# Patient Record
Sex: FEMALE | Race: BLACK OR AFRICAN AMERICAN | NOT HISPANIC OR LATINO | URBAN - METROPOLITAN AREA
[De-identification: names, ages, dates, MRNs, and addresses within clinical notes are randomized per-mention and may not be internally consistent; named-entity substitution may affect disease eponyms.]

---

## 2021-08-16 ENCOUNTER — OUT OF OFFICE VISIT (OUTPATIENT)
Dept: URBAN - METROPOLITAN AREA MEDICAL CENTER 1 | Facility: MEDICAL CENTER | Age: 81
End: 2021-08-16
Payer: MEDICARE

## 2021-08-16 DIAGNOSIS — D64.89 ANEMIA DUE TO OTHER CAUSE: ICD-10-CM

## 2021-08-16 DIAGNOSIS — K92.1 BLACK STOOL: ICD-10-CM

## 2021-08-16 DIAGNOSIS — K29.60 ADENOPAPILLOMATOSIS GASTRICA: ICD-10-CM

## 2021-08-16 PROCEDURE — 99222 1ST HOSP IP/OBS MODERATE 55: CPT | Performed by: INTERNAL MEDICINE

## 2021-08-16 PROCEDURE — G8427 DOCREV CUR MEDS BY ELIG CLIN: HCPCS | Performed by: INTERNAL MEDICINE

## 2021-08-16 PROCEDURE — 43239 EGD BIOPSY SINGLE/MULTIPLE: CPT | Performed by: INTERNAL MEDICINE

## 2021-08-17 ENCOUNTER — OUT OF OFFICE VISIT (OUTPATIENT)
Dept: URBAN - METROPOLITAN AREA MEDICAL CENTER 1 | Facility: MEDICAL CENTER | Age: 81
End: 2021-08-17
Payer: MEDICARE

## 2021-08-17 DIAGNOSIS — K92.1 BLACK STOOL: ICD-10-CM

## 2021-08-17 PROCEDURE — 45378 DIAGNOSTIC COLONOSCOPY: CPT | Performed by: INTERNAL MEDICINE

## 2021-10-11 ENCOUNTER — OFFICE VISIT (OUTPATIENT)
Dept: URBAN - METROPOLITAN AREA TELEHEALTH 2 | Facility: TELEHEALTH | Age: 81
End: 2021-10-11

## 2021-11-01 ENCOUNTER — OFFICE VISIT (OUTPATIENT)
Dept: URBAN - NONMETROPOLITAN AREA CLINIC 2 | Facility: CLINIC | Age: 81
End: 2021-11-01
Payer: MEDICARE

## 2021-11-01 ENCOUNTER — WEB ENCOUNTER (OUTPATIENT)
Dept: URBAN - NONMETROPOLITAN AREA CLINIC 2 | Facility: CLINIC | Age: 81
End: 2021-11-01

## 2021-11-01 VITALS
HEIGHT: 63 IN | WEIGHT: 183.4 LBS | HEART RATE: 62 BPM | DIASTOLIC BLOOD PRESSURE: 70 MMHG | TEMPERATURE: 98 F | SYSTOLIC BLOOD PRESSURE: 170 MMHG | BODY MASS INDEX: 32.5 KG/M2

## 2021-11-01 DIAGNOSIS — K57.90 DIVERTICULOSIS: ICD-10-CM

## 2021-11-01 DIAGNOSIS — Z72.0 CURRENT TOBACCO USE: ICD-10-CM

## 2021-11-01 DIAGNOSIS — D64.9 ANEMIA, UNSPECIFIED TYPE: ICD-10-CM

## 2021-11-01 DIAGNOSIS — N28.89 RENAL MASS: ICD-10-CM

## 2021-11-01 DIAGNOSIS — K29.70 GASTRITIS, PRESENCE OF BLEEDING UNSPECIFIED, UNSPECIFIED CHRONICITY, UNSPECIFIED GASTRITIS TYPE: ICD-10-CM

## 2021-11-01 DIAGNOSIS — K59.00 CONSTIPATION, UNSPECIFIED CONSTIPATION TYPE: ICD-10-CM

## 2021-11-01 PROCEDURE — 99214 OFFICE O/P EST MOD 30 MIN: CPT | Performed by: INTERNAL MEDICINE

## 2021-11-01 RX ORDER — GLIPIZIDE 5 MG/1
TABLET ORAL
Qty: 180 | Status: ACTIVE | COMMUNITY

## 2021-11-01 RX ORDER — AMLODIPINE BESYLATE 10 MG/1
TABLET ORAL
Qty: 90 | Status: ACTIVE | COMMUNITY

## 2021-11-01 RX ORDER — POLYETHYLENE GLYCOL 3350, SODIUM SULFATE, SODIUM CHLORIDE, POTASSIUM CHLORIDE, ASCORBIC ACID, SODIUM ASCORBATE 140-9-5.2G
AS DIRECTED KIT ORAL AS DIRECTED
Qty: 280 GRAM | Refills: 0 | OUTPATIENT
Start: 2021-11-01 | End: 2021-11-02

## 2021-11-01 RX ORDER — LORATADINE 10 MG
1 PACKET MIXED WITH 8 OUNCES OF FLUID TABLET,DISINTEGRATING ORAL ONCE A DAY
Qty: 90 | Refills: 0 | OUTPATIENT
Start: 2021-11-01 | End: 2022-01-29

## 2021-11-01 RX ORDER — DULOXETINE HYDROCHLORIDE 20 MG/1
CAPSULE, DELAYED RELEASE PELLETS ORAL
Qty: 90 | Status: ACTIVE | COMMUNITY

## 2021-11-01 RX ORDER — PRAVASTATIN SODIUM 80 MG/1
TABLET ORAL
Qty: 90 | Status: ACTIVE | COMMUNITY

## 2021-11-01 RX ORDER — BLOOD SUGAR DIAGNOSTIC
STRIP MISCELLANEOUS
Qty: 100 | Status: ACTIVE | COMMUNITY

## 2021-11-01 RX ORDER — ATENOLOL 50 MG/1
TABLET ORAL
Qty: 90 | Status: ACTIVE | COMMUNITY

## 2021-11-01 RX ORDER — TRIAMTERENE AND HYDROCHLOROTHIAZIDE 37.5; 25 MG/1; MG/1
TABLET ORAL
Qty: 90 | Status: ON HOLD | COMMUNITY

## 2021-11-01 RX ORDER — LANCETS
EACH MISCELLANEOUS
Qty: 100 | Status: ACTIVE | COMMUNITY

## 2021-11-01 RX ORDER — OMEPRAZOLE 40 MG/1
CAPSULE, DELAYED RELEASE ORAL
Qty: 90 | Status: ACTIVE | COMMUNITY

## 2021-11-01 NOTE — HPI-TODAY'S VISIT:
11/1/2021: Gastroenterology Clinic Visit 81 year old female with past medical history of diabetes melltius, hypertension, diverticulosis c/b diverticulitis, tobacco use, who was hospitalized at [facility] from 8/15/2021 to 8/17/2021 for melena who presents for follow-up.  Ms. Garcia was hospitalized at [facility] for melena in August 2021. At the time she did acknowledge taking 2 Aleve per day. During the hospitalization, she underwent an EGD on August 16, 2021 which showed a normal esophagus, normal stomach, normal duodneum. BIopsies were taken of the stomach which showed "mild chronic gastritis without evidence of Helicobacter pylori." A colonoscopy was performed on 8/17/2021 which showed multiple small and large mouthed diverticula in the entire colon to the hepatic flexure. The colonoscope was advanced to the hepatic flexure without difficulty. Due to the development of hypoxia in the setting of oropharyngeal secretions with oxygen saturation decreasing to 55%, the procedure was aborted and the colonoscope was withdrawn without ability to carefully examine the colonic mucosa. There was on evidence of blood loss throughout the colon.  Recommendation was to undergo repeat colonoscopy during the hospitalization although she declined. At today's visit, she notes that she had a colonoscopy 10 years ago but most recently undergwent cologuard within the past 3-4 years. Since being discharged, she has been followed by Urology for a suspeted renal cancer. She is in the process of discussing treatmetn options ofr the renal cancer.  She denies melena, hematochezia, nausea, vomiting, abdominal pain. She is not using ibuprofen or other NSAIDs. She does continue to use tobacco.   She denies family history of colon polyps or colon cancer.   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

## 2021-11-14 ENCOUNTER — DASHBOARD ENCOUNTERS (OUTPATIENT)
Age: 81
End: 2021-11-14

## 2021-11-14 PROBLEM — 110483000: Status: ACTIVE | Noted: 2021-11-14

## 2021-11-14 PROBLEM — 271737000: Status: ACTIVE | Noted: 2021-11-01

## 2021-12-03 ENCOUNTER — TELEPHONE ENCOUNTER (OUTPATIENT)
Dept: URBAN - NONMETROPOLITAN AREA CLINIC 2 | Facility: CLINIC | Age: 81
End: 2021-12-03

## 2021-12-07 ENCOUNTER — OFFICE VISIT (OUTPATIENT)
Dept: URBAN - METROPOLITAN AREA MEDICAL CENTER 1 | Facility: MEDICAL CENTER | Age: 81
End: 2021-12-07
Payer: MEDICARE

## 2021-12-07 DIAGNOSIS — K92.1 ACUTE MELENA: ICD-10-CM

## 2021-12-07 DIAGNOSIS — K63.89 BACTERIAL OVERGROWTH SYNDROME: ICD-10-CM

## 2021-12-07 PROCEDURE — 45380 COLONOSCOPY AND BIOPSY: CPT | Performed by: INTERNAL MEDICINE

## 2021-12-07 RX ORDER — ATENOLOL 50 MG/1
TABLET ORAL
Qty: 90 | Status: ACTIVE | COMMUNITY

## 2021-12-07 RX ORDER — DULOXETINE HYDROCHLORIDE 20 MG/1
CAPSULE, DELAYED RELEASE PELLETS ORAL
Qty: 90 | Status: ACTIVE | COMMUNITY

## 2021-12-07 RX ORDER — OMEPRAZOLE 40 MG/1
CAPSULE, DELAYED RELEASE ORAL
Qty: 90 | Status: ACTIVE | COMMUNITY

## 2021-12-07 RX ORDER — TRIAMTERENE AND HYDROCHLOROTHIAZIDE 37.5; 25 MG/1; MG/1
TABLET ORAL
Qty: 90 | Status: ON HOLD | COMMUNITY

## 2021-12-07 RX ORDER — AMLODIPINE BESYLATE 10 MG/1
TABLET ORAL
Qty: 90 | Status: ACTIVE | COMMUNITY

## 2021-12-07 RX ORDER — LANCETS
EACH MISCELLANEOUS
Qty: 100 | Status: ACTIVE | COMMUNITY

## 2021-12-07 RX ORDER — BLOOD SUGAR DIAGNOSTIC
STRIP MISCELLANEOUS
Qty: 100 | Status: ACTIVE | COMMUNITY

## 2021-12-07 RX ORDER — PRAVASTATIN SODIUM 80 MG/1
TABLET ORAL
Qty: 90 | Status: ACTIVE | COMMUNITY

## 2021-12-07 RX ORDER — GLIPIZIDE 5 MG/1
TABLET ORAL
Qty: 180 | Status: ACTIVE | COMMUNITY

## 2021-12-07 RX ORDER — LORATADINE 10 MG
1 PACKET MIXED WITH 8 OUNCES OF FLUID TABLET,DISINTEGRATING ORAL ONCE A DAY
Qty: 90 | Refills: 0 | Status: ACTIVE | COMMUNITY
Start: 2021-11-01 | End: 2022-01-29

## 2022-01-01 NOTE — PHYSICAL EXAM CONSTITUTIONAL:
well nourished , in no acute distress , ambulating without difficulty , normal communication ability 
Unknown

## 2022-01-07 ENCOUNTER — OFFICE VISIT (OUTPATIENT)
Dept: URBAN - NONMETROPOLITAN AREA CLINIC 2 | Facility: CLINIC | Age: 82
End: 2022-01-07

## 2022-01-14 ENCOUNTER — OFFICE VISIT (OUTPATIENT)
Dept: URBAN - NONMETROPOLITAN AREA CLINIC 2 | Facility: CLINIC | Age: 82
End: 2022-01-14

## 2022-01-14 RX ORDER — TRIAMTERENE AND HYDROCHLOROTHIAZIDE 37.5; 25 MG/1; MG/1
TABLET ORAL
Qty: 90 | Status: ON HOLD | COMMUNITY

## 2022-01-14 RX ORDER — BLOOD SUGAR DIAGNOSTIC
STRIP MISCELLANEOUS
Qty: 100 | Status: ACTIVE | COMMUNITY

## 2022-01-14 RX ORDER — ATENOLOL 50 MG/1
TABLET ORAL
Qty: 90 | Status: ACTIVE | COMMUNITY

## 2022-01-14 RX ORDER — DULOXETINE HYDROCHLORIDE 20 MG/1
CAPSULE, DELAYED RELEASE PELLETS ORAL
Qty: 90 | Status: ACTIVE | COMMUNITY

## 2022-01-14 RX ORDER — LORATADINE 10 MG
1 PACKET MIXED WITH 8 OUNCES OF FLUID TABLET,DISINTEGRATING ORAL ONCE A DAY
Qty: 90 | Refills: 0 | Status: ACTIVE | COMMUNITY
Start: 2021-11-01 | End: 2022-01-29

## 2022-01-14 RX ORDER — LANCETS
EACH MISCELLANEOUS
Qty: 100 | Status: ACTIVE | COMMUNITY

## 2022-01-14 RX ORDER — OMEPRAZOLE 40 MG/1
CAPSULE, DELAYED RELEASE ORAL
Qty: 90 | Status: ACTIVE | COMMUNITY

## 2022-01-14 RX ORDER — AMLODIPINE BESYLATE 10 MG/1
TABLET ORAL
Qty: 90 | Status: ACTIVE | COMMUNITY

## 2022-01-14 RX ORDER — LORATADINE 10 MG
1 PACKET MIXED WITH 8 OUNCES OF FLUID TABLET,DISINTEGRATING ORAL ONCE A DAY
Qty: 90 | Refills: 0 | OUTPATIENT

## 2022-01-14 RX ORDER — PRAVASTATIN SODIUM 80 MG/1
TABLET ORAL
Qty: 90 | Status: ACTIVE | COMMUNITY

## 2022-01-14 RX ORDER — GLIPIZIDE 5 MG/1
TABLET ORAL
Qty: 180 | Status: ACTIVE | COMMUNITY

## 2022-01-14 NOTE — HPI-TODAY'S VISIT:
11/1/2021: Gastroenterology Clinic Visit  81 year old female with past medical history of diabetes mellitus, hypertension, diverticulosis c/b diverticulitis, tobacco use, who was hospitalized at [facility] from 8/15/2021 to 8/17/2021 for melena who presents for follow-up.  Ms. Garcia was hospitalized at [facility] for melena in August 2021. At the time she did acknowledge taking 2 Aleve per day. During the hospitalization, she underwent an EGD on August 16, 2021 which showed a normal esophagus, normal stomach, normal duodneum. BIopsies were taken of the stomach which showed "mild chronic gastritis without evidence of Helicobacter pylori." A colonoscopy was performed on 8/17/2021 which showed multiple small and large mouthed diverticula in the entire colon to the hepatic flexure. The colonoscope was advanced to the hepatic flexure without difficulty. Due to the development of hypoxia in the setting of oropharyngeal secretions with oxygen saturation decreasing to 55%, the procedure was aborted and the colonoscope was withdrawn without ability to carefully examine the colonic mucosa. There was no evidence of blood loss throughout the colon.  Recommendation was to undergo repeat colonoscopy during the hospitalization although she declined. At today's visit, she notes that she had a colonoscopy 10 years ago but most recently underwent cologuard within the past 3-4 years. Since being discharged, she has been followed by Urology for a suspeted renal cancer. She is in the process of discussing treatmetn options for the renal cancer.  She denies melena, hematochezia, nausea, vomiting, abdominal pain. She is not using ibuprofen or other NSAIDs. She does continue to use tobacco.   She denies family history of colon polyps or colon cancer.   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  12/7/2021: Colonoscopy Findings: - The perianal and digital rectal examinations were normal. - The terminal ileum appeared normal. - A 2 mm polyp was found in the cecum. The polyp was sessile. The polyp was removed with a cold biopsy forceps. Resection and retrieval were complete. Estimated blood loss was minimal. - A 3 mm polyp was found in the sigmoid colon. The polyp was sessile. The polyp was removed with a cold biopsy forceps. Resection and retrieval were complete. Estimated blood loss was minimal. - Multiple small and large-mouthed diverticula were found in the entire colon. - Hemorrhoids were found during retroflexion. 12/7/2021: Pathology from Colonoscopy  Final Diagnosis A. COLON, CECUM BIOPSY:         MODERATE CHRONIC NONSPECIFIC INFLAMMATION.         NO CARCINOMA SEEN.  B. COLON, SIGMOID BIOPSY:         MILD TO MODERATE CHRONIC NONSPECIFIC INFLAMMATION.         NO CARCINOMA SEEN. Plan:  -Obtain labs. -Obtain Helicobacter pylori.   -Discuss repeat colonoscopy given evidence of colitis.

## 2022-03-21 ENCOUNTER — OFFICE VISIT (OUTPATIENT)
Dept: URBAN - NONMETROPOLITAN AREA CLINIC 2 | Facility: CLINIC | Age: 82
End: 2022-03-21

## 2022-03-21 RX ORDER — LORATADINE 10 MG
1 PACKET MIXED WITH 8 OUNCES OF FLUID TABLET,DISINTEGRATING ORAL ONCE A DAY
Qty: 90 | Refills: 0 | Status: ACTIVE | COMMUNITY

## 2022-03-21 RX ORDER — DULOXETINE HYDROCHLORIDE 20 MG/1
CAPSULE, DELAYED RELEASE PELLETS ORAL
Qty: 90 | Status: ACTIVE | COMMUNITY

## 2022-03-21 RX ORDER — TRIAMTERENE AND HYDROCHLOROTHIAZIDE 37.5; 25 MG/1; MG/1
TABLET ORAL
Qty: 90 | Status: ON HOLD | COMMUNITY

## 2022-03-21 RX ORDER — OMEPRAZOLE 40 MG/1
CAPSULE, DELAYED RELEASE ORAL
Qty: 90 | Status: ACTIVE | COMMUNITY

## 2022-03-21 RX ORDER — LANCETS
EACH MISCELLANEOUS
Qty: 100 | Status: ACTIVE | COMMUNITY

## 2022-03-21 RX ORDER — PRAVASTATIN SODIUM 80 MG/1
TABLET ORAL
Qty: 90 | Status: ACTIVE | COMMUNITY

## 2022-03-21 RX ORDER — ATENOLOL 50 MG/1
TABLET ORAL
Qty: 90 | Status: ACTIVE | COMMUNITY

## 2022-03-21 RX ORDER — GLIPIZIDE 5 MG/1
TABLET ORAL
Qty: 180 | Status: ACTIVE | COMMUNITY

## 2022-03-21 RX ORDER — LORATADINE 10 MG
1 PACKET MIXED WITH 8 OUNCES OF FLUID TABLET,DISINTEGRATING ORAL ONCE A DAY
Qty: 90 | Refills: 0 | OUTPATIENT

## 2022-03-21 RX ORDER — AMLODIPINE BESYLATE 10 MG/1
TABLET ORAL
Qty: 90 | Status: ACTIVE | COMMUNITY

## 2022-03-21 RX ORDER — BLOOD SUGAR DIAGNOSTIC
STRIP MISCELLANEOUS
Qty: 100 | Status: ACTIVE | COMMUNITY

## 2022-03-21 NOTE — HPI-TODAY'S VISIT:
11/1/2021: Gastroenterology Clinic Visit    81 year old female with past medical history of diabetes mellitus, hypertension, diverticulosis c/b diverticulitis, tobacco use, who was hospitalized at [facility] from 8/15/2021 to 8/17/2021 for melena who presents for follow-up.  Ms. Garcia was hospitalized at [facility] for melena in August 2021. At the time she did acknowledge taking 2 Aleve per day. During the hospitalization, she underwent an EGD on August 16, 2021 which showed a normal esophagus, normal stomach, normal duodneum. BIopsies were taken of the stomach which showed "mild chronic gastritis without evidence of Helicobacter pylori." A colonoscopy was performed on 8/17/2021 which showed multiple small and large mouthed diverticula in the entire colon to the hepatic flexure. The colonoscope was advanced to the hepatic flexure without difficulty. Due to the development of hypoxia in the setting of oropharyngeal secretions with oxygen saturation decreasing to 55%, the procedure was aborted and the colonoscope was withdrawn without ability to carefully examine the colonic mucosa. There was no evidence of blood loss throughout the colon.  Recommendation was to undergo repeat colonoscopy during the hospitalization although she declined. At today's visit, she notes that she had a colonoscopy 10 years ago but most recently underwent cologuard within the past 3-4 years. Since being discharged, she has been followed by Urology for a suspeted renal cancer. She is in the process of discussing treatment options for the renal cancer.  She denies melena, hematochezia, nausea, vomiting, abdominal pain. She is not using ibuprofen or other NSAIDs. She does continue to use tobacco.   She denies family history of colon polyps or colon cancer.   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  12/7/2021: Colonoscopy Findings: - The perianal and digital rectal examinations were normal. - The terminal ileum appeared normal. - A 2 mm polyp was found in the cecum. The polyp was sessile. The polyp was removed with a cold biopsy forceps. Resection and retrieval were complete. Estimated blood loss was minimal. - A 3 mm polyp was found in the sigmoid colon. The polyp was sessile. The polyp was removed with a cold biopsy forceps. Resection and retrieval were complete. Estimated blood loss was minimal. - Multiple small and large-mouthed diverticula were found in the entire colon. - Hemorrhoids were found during retroflexion. 12/7/2021: Pathology from Colonoscopy  Final Diagnosis A. COLON, CECUM BIOPSY:         MODERATE CHRONIC NONSPECIFIC INFLAMMATION.         NO CARCINOMA SEEN.  B. COLON, SIGMOID BIOPSY:         MILD TO MODERATE CHRONIC NONSPECIFIC INFLAMMATION.         NO CARCINOMA SEEN.  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  Plan:  -Obtain Helicobacter pylori.   -Discuss repeat colonoscopy given evidence of colitis.

## 2022-09-27 ENCOUNTER — TELEPHONE ENCOUNTER (OUTPATIENT)
Dept: URBAN - NONMETROPOLITAN AREA CLINIC 2 | Facility: CLINIC | Age: 82
End: 2022-09-27

## 2022-11-09 PROBLEM — 4556007: Status: ACTIVE | Noted: 2021-11-01

## 2022-11-09 PROBLEM — 14760008: Status: ACTIVE | Noted: 2021-11-01

## 2022-11-09 PROBLEM — 309088003: Status: ACTIVE | Noted: 2021-11-01

## 2022-11-09 PROBLEM — 397881000: Status: ACTIVE | Noted: 2021-11-14

## 2022-11-10 ENCOUNTER — OFFICE VISIT (OUTPATIENT)
Dept: URBAN - NONMETROPOLITAN AREA CLINIC 2 | Facility: CLINIC | Age: 82
End: 2022-11-10

## 2022-11-10 RX ORDER — LORATADINE 10 MG
1 PACKET MIXED WITH 8 OUNCES OF FLUID TABLET,DISINTEGRATING ORAL ONCE A DAY
Qty: 90 | Refills: 0 | OUTPATIENT

## 2022-11-10 RX ORDER — AMLODIPINE BESYLATE 10 MG/1
TABLET ORAL
Qty: 90 | Status: ACTIVE | COMMUNITY

## 2022-11-10 RX ORDER — OMEPRAZOLE 40 MG/1
CAPSULE, DELAYED RELEASE ORAL
Qty: 90 | Status: ACTIVE | COMMUNITY

## 2022-11-10 RX ORDER — BLOOD SUGAR DIAGNOSTIC
STRIP MISCELLANEOUS
Qty: 100 | Status: ACTIVE | COMMUNITY

## 2022-11-10 RX ORDER — DULOXETINE HYDROCHLORIDE 20 MG/1
CAPSULE, DELAYED RELEASE PELLETS ORAL
Qty: 90 | Status: ACTIVE | COMMUNITY

## 2022-11-10 RX ORDER — TRIAMTERENE AND HYDROCHLOROTHIAZIDE 37.5; 25 MG/1; MG/1
TABLET ORAL
Qty: 90 | Status: ON HOLD | COMMUNITY

## 2022-11-10 RX ORDER — GLIPIZIDE 5 MG/1
TABLET ORAL
Qty: 180 | Status: ACTIVE | COMMUNITY

## 2022-11-10 RX ORDER — ATENOLOL 50 MG/1
TABLET ORAL
Qty: 90 | Status: ACTIVE | COMMUNITY

## 2022-11-10 RX ORDER — LORATADINE 10 MG
1 PACKET MIXED WITH 8 OUNCES OF FLUID TABLET,DISINTEGRATING ORAL ONCE A DAY
Qty: 90 | Refills: 0 | Status: ACTIVE | COMMUNITY

## 2022-11-10 RX ORDER — LANCETS
EACH MISCELLANEOUS
Qty: 100 | Status: ACTIVE | COMMUNITY

## 2022-11-10 RX ORDER — PRAVASTATIN SODIUM 80 MG/1
TABLET ORAL
Qty: 90 | Status: ACTIVE | COMMUNITY

## 2022-11-10 NOTE — HPI-TODAY'S VISIT:
11/1/2021: Gastroenterology Clinic Visit     81 year old female with past medical history of diabetes mellitus, hypertension, diverticulosis c/b diverticulitis, tobacco use, who was hospitalized at [facility] from 8/15/2021 to 8/17/2021 for melena who presents for follow-up.  Ms. Garcia was hospitalized at [facility] for melena in August 2021. At the time she did acknowledge taking 2 Aleve per day. During the hospitalization, she underwent an EGD on August 16, 2021 which showed a normal esophagus, normal stomach, normal duodneum. BIopsies were taken of the stomach which showed "mild chronic gastritis without evidence of Helicobacter pylori." A colonoscopy was performed on 8/17/2021 which showed multiple small and large mouthed diverticula in the entire colon to the hepatic flexure. The colonoscope was advanced to the hepatic flexure without difficulty. Due to the development of hypoxia in the setting of oropharyngeal secretions with oxygen saturation decreasing to 55%, the procedure was aborted and the colonoscope was withdrawn without ability to carefully examine the colonic mucosa. There was no evidence of blood loss throughout the colon.  Recommendation was to undergo repeat colonoscopy during the hospitalization although she declined. At today's visit, she notes that she had a colonoscopy 10 years ago but most recently underwent cologuard within the past 3-4 years. Since being discharged, she has been followed by Urology for a suspeted renal cancer. She is in the process of discussing treatment options for the renal cancer.  She denies melena, hematochezia, nausea, vomiting, abdominal pain. She is not using ibuprofen or other NSAIDs. She does continue to use tobacco.   She denies family history of colon polyps or colon cancer.   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  12/7/2021: Colonoscopy Findings: - The perianal and digital rectal examinations were normal. - The terminal ileum appeared normal. - A 2 mm polyp was found in the cecum. The polyp was sessile. The polyp was removed with a cold biopsy forceps. Resection and retrieval were complete. Estimated blood loss was minimal. - A 3 mm polyp was found in the sigmoid colon. The polyp was sessile. The polyp was removed with a cold biopsy forceps. Resection and retrieval were complete. Estimated blood loss was minimal. - Multiple small and large-mouthed diverticula were found in the entire colon. - Hemorrhoids were found during retroflexion. 12/7/2021: Pathology from Colonoscopy  Final Diagnosis A. COLON, CECUM BIOPSY:         MODERATE CHRONIC NONSPECIFIC INFLAMMATION.         NO CARCINOMA SEEN.  B. COLON, SIGMOID BIOPSY:         MILD TO MODERATE CHRONIC NONSPECIFIC INFLAMMATION.         NO CARCINOMA SEEN.  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  Plan:  -Obtain Helicobacter pylori.   -Discuss repeat colonoscopy given evidence of colitis.